# Patient Record
Sex: MALE | Race: WHITE | Employment: FULL TIME | URBAN - NONMETROPOLITAN AREA
[De-identification: names, ages, dates, MRNs, and addresses within clinical notes are randomized per-mention and may not be internally consistent; named-entity substitution may affect disease eponyms.]

---

## 2024-03-23 ENCOUNTER — HOSPITAL ENCOUNTER (EMERGENCY)
Age: 29
Discharge: HOME | End: 2024-03-23
Payer: COMMERCIAL

## 2024-03-23 VITALS
DIASTOLIC BLOOD PRESSURE: 73 MMHG | HEART RATE: 125 BPM | SYSTOLIC BLOOD PRESSURE: 113 MMHG | OXYGEN SATURATION: 96 % | TEMPERATURE: 100.5 F | HEIGHT: 76 IN | RESPIRATION RATE: 18 BRPM | WEIGHT: 235 LBS | BODY MASS INDEX: 28.62 KG/M2

## 2024-03-23 DIAGNOSIS — J01.00 ACUTE NON-RECURRENT MAXILLARY SINUSITIS: Primary | ICD-10-CM

## 2024-03-23 DIAGNOSIS — J06.9 VIRAL URI WITH COUGH: ICD-10-CM

## 2024-03-23 PROCEDURE — 99213 OFFICE O/P EST LOW 20 MIN: CPT

## 2024-03-23 PROCEDURE — 99202 OFFICE O/P NEW SF 15 MIN: CPT

## 2024-03-23 RX ORDER — LORATADINE 10 MG/1
10 CAPSULE, LIQUID FILLED ORAL DAILY
COMMUNITY

## 2024-03-23 RX ORDER — BENZONATATE 100 MG/1
100 CAPSULE ORAL 3 TIMES DAILY PRN
COMMUNITY
End: 2024-03-23 | Stop reason: ALTCHOICE

## 2024-03-23 RX ORDER — AMOXICILLIN AND CLAVULANATE POTASSIUM 875; 125 MG/1; MG/1
1 TABLET, FILM COATED ORAL 2 TIMES DAILY
Qty: 14 TABLET | Refills: 0 | Status: SHIPPED | OUTPATIENT
Start: 2024-03-23 | End: 2024-03-30

## 2024-03-23 RX ORDER — PREDNISONE 20 MG/1
20 TABLET ORAL 2 TIMES DAILY
Qty: 10 TABLET | Refills: 0 | Status: SHIPPED | OUTPATIENT
Start: 2024-03-23 | End: 2024-03-28

## 2024-03-23 RX ORDER — BENZONATATE 200 MG/1
200 CAPSULE ORAL 3 TIMES DAILY PRN
Qty: 30 CAPSULE | Refills: 0 | Status: SHIPPED | OUTPATIENT
Start: 2024-03-23 | End: 2024-03-30

## 2024-03-23 ASSESSMENT — ENCOUNTER SYMPTOMS
COUGH: 1
SINUS PRESSURE: 1
VOMITING: 0
ABDOMINAL PAIN: 0
SINUS PAIN: 1
WHEEZING: 0
SHORTNESS OF BREATH: 0
NAUSEA: 0

## 2024-03-23 ASSESSMENT — PAIN - FUNCTIONAL ASSESSMENT: PAIN_FUNCTIONAL_ASSESSMENT: NONE - DENIES PAIN

## 2024-03-23 NOTE — ED NOTES
Pt with complaints of a cough and sinus pressure for 3-4 weeks. States he has been started on many different decongestants and cough medication which has helped a little bit. States mucus is green.     Giovanny Manriquez, ROSE  03/23/24 1854

## 2024-03-23 NOTE — ED PROVIDER NOTES
[facility] URGENT CARE  Urgent Care Encounter      CHIEF COMPLAINT       Chief Complaint   Patient presents with    Cough    Sinusitis       Nurses Notes reviewed and I agree except as noted in the HPI.  HISTORY OF PRESENT ILLNESS   Camilo Masterson is a 28 y.o. male who presents to urgent care with complaints of fever, sinus pressure and pain, cough, and body aches. Patient reports symptoms have been ongoing for two weeks. Patient reports fever just started a few days ago. Patient reports he has been taking sudafed for his symptoms. Patient denies abdominal pain, shortness of breath, diarrhea. Patient denies being around anyone sick recently.    REVIEW OF SYSTEMS     Review of Systems   Constitutional:  Positive for fatigue and fever.   HENT:  Positive for congestion, sinus pressure and sinus pain.    Respiratory:  Positive for cough. Negative for shortness of breath and wheezing.    Cardiovascular:  Negative for chest pain.   Gastrointestinal:  Negative for abdominal pain, nausea and vomiting.   Musculoskeletal:  Negative for arthralgias.   Neurological:  Positive for headaches. Negative for dizziness and numbness.       PAST MEDICAL HISTORY   History reviewed. No pertinent past medical history.    SURGICAL HISTORY     Patient  has a past surgical history that includes [place] tooth extraction.    CURRENT MEDICATIONS       Discharge Medication List as of 3/23/2024  6:58 PM        CONTINUE these medications which have NOT CHANGED    Details   loratadine (CLARITIN) 10 MG capsule Take 1 capsule by mouth dailyHistorical Med             ALLERGIES     Patient is has No Known Allergies.    FAMILY HISTORY     Patient'sfamily history is not on file.    SOCIAL HISTORY     Patient  reports that he has never smoked. He has never used smokeless tobacco. He reports that he does not drink alcohol and does not use drugs.    PHYSICAL EXAM     ED TRIAGE VITALS  BP: 113/73, Temp: (!) 100.5 °F (38.1 °C), Pulse: (!) 125,

## 2024-10-17 ENCOUNTER — OFFICE VISIT (OUTPATIENT)
Dept: FAMILY MEDICINE CLINIC | Age: 29
End: 2024-10-17

## 2024-10-17 VITALS
WEIGHT: 238.2 LBS | TEMPERATURE: 98.9 F | BODY MASS INDEX: 29.62 KG/M2 | DIASTOLIC BLOOD PRESSURE: 66 MMHG | RESPIRATION RATE: 16 BRPM | HEART RATE: 72 BPM | SYSTOLIC BLOOD PRESSURE: 122 MMHG | HEIGHT: 75 IN

## 2024-10-17 DIAGNOSIS — Z11.59 NEED FOR HEPATITIS C SCREENING TEST: ICD-10-CM

## 2024-10-17 DIAGNOSIS — Z23 NEED FOR INFLUENZA VACCINATION: ICD-10-CM

## 2024-10-17 DIAGNOSIS — Z23 NEED FOR TETANUS BOOSTER: ICD-10-CM

## 2024-10-17 DIAGNOSIS — N50.89 MASS OF RIGHT TESTICLE: Primary | ICD-10-CM

## 2024-10-17 SDOH — ECONOMIC STABILITY: FOOD INSECURITY: WITHIN THE PAST 12 MONTHS, YOU WORRIED THAT YOUR FOOD WOULD RUN OUT BEFORE YOU GOT MONEY TO BUY MORE.: PATIENT DECLINED

## 2024-10-17 SDOH — ECONOMIC STABILITY: INCOME INSECURITY: HOW HARD IS IT FOR YOU TO PAY FOR THE VERY BASICS LIKE FOOD, HOUSING, MEDICAL CARE, AND HEATING?: PATIENT DECLINED

## 2024-10-17 SDOH — ECONOMIC STABILITY: FOOD INSECURITY: WITHIN THE PAST 12 MONTHS, THE FOOD YOU BOUGHT JUST DIDN'T LAST AND YOU DIDN'T HAVE MONEY TO GET MORE.: PATIENT DECLINED

## 2024-10-17 ASSESSMENT — ENCOUNTER SYMPTOMS
EYE REDNESS: 0
DIARRHEA: 0
BLOOD IN STOOL: 0
SORE THROAT: 0
NAUSEA: 0
COUGH: 0
CONSTIPATION: 0
ABDOMINAL DISTENTION: 0
SHORTNESS OF BREATH: 0
ABDOMINAL PAIN: 0
ANAL BLEEDING: 0
EYE DISCHARGE: 0
RHINORRHEA: 1
COLOR CHANGE: 0

## 2024-10-17 ASSESSMENT — PATIENT HEALTH QUESTIONNAIRE - PHQ9
SUM OF ALL RESPONSES TO PHQ QUESTIONS 1-9: 0
SUM OF ALL RESPONSES TO PHQ QUESTIONS 1-9: 0
2. FEELING DOWN, DEPRESSED OR HOPELESS: NOT AT ALL
SUM OF ALL RESPONSES TO PHQ QUESTIONS 1-9: 0
SUM OF ALL RESPONSES TO PHQ QUESTIONS 1-9: 0
1. LITTLE INTEREST OR PLEASURE IN DOING THINGS: NOT AT ALL
SUM OF ALL RESPONSES TO PHQ9 QUESTIONS 1 & 2: 0

## 2024-10-17 NOTE — PROGRESS NOTES
Vaccine Information Sheet, \"Influenza - Inactivated\"  given to Camilo Corona, and verbalized understanding.    Patient responses:    Have you ever had a reaction to a flu vaccine? No  Do you have an allergy to eggs, Latex -induced anaphylaxis, neomycin or polymixin?  No  Do you have an allergy to Thimerosal, contact lens solution, or Merthiolate? No  Have you ever had [place] Syndrome?  No  Do you have any current illness?  No  Do you have a temperature above 100.4 degrees? No  Do you have an active neurological disorder? No    Immunization(s) given during visit:    Immunizations Administered       Name Date Dose Route    Influenza, FLUCELVAX, (age 6 mo+) IM, Trivalent PF, 0.5mL 10/17/2024 0.5 mL Intramuscular    Site: Deltoid- Left    Lot: [id number]    NDC: 18407-795-53    TDaP, ADACEL (age 10y-64y), BOOSTRIX (age 10y+), IM, 0.5mL 10/17/2024 0.5 mL Intramuscular    Site: Deltoid- Left    Lot: [id number]    ND: 41274-931-96          After obtaining consent, and per orders of Kaitlynn Hernandez CNP, the injections above were given by Jonathan Barnard LPN. Medications were verified by Tawny Huber RN.    Patient tolerated well.  
Conjunctivae normal.   Pulmonary:      Effort: Pulmonary effort is normal. No respiratory distress.   Abdominal:      General: There is no distension.      Tenderness: There is no guarding.   Genitourinary:     Testes:         Right: Mass and tenderness present.   Musculoskeletal:         General: No tenderness or deformity. Normal range of motion.      Cervical back: Normal range of motion and neck supple.   Skin:     Coloration: Skin is not pale.      Findings: No erythema or rash (On exposed areas).   Neurological:      General: No focal deficit present.      Mental Status: He is alert and oriented to person, place, and time.      Gait: Gait normal.   Psychiatric:         Mood and Affect: Mood normal.         Speech: Speech normal.         Behavior: Behavior normal.         Thought Content: Thought content normal.         Judgment: Judgment normal.       No results found for: \"WBC\", \"HGB\", \"HCT\", \"PLT\", \"CHOL\", \"TRIG\", \"HDL\", \"LDLDIRECT\", \"LDL\", \"AST\", \"NA\", \"K\", \"CL\", \"CREATININE\", \"BUN\", \"CO2\", \"TSH\", \"PSA\", \"INR\", \"GLUF\", \"LABA1C\", \"LABGLOM\", \"MG\", \"CALCIUM\", \"VITD25\"  Assessment:   Assessment & Plan    Diagnosis Orders   1. Mass of right testicle  US SCROTUM AND TESTICLES    CBC with Auto Differential    Comprehensive Metabolic Panel    TSH with Reflex    T4, Free    Lipid Panel    Vitamin D 25 Hydroxy    Glucose, Fasting      2. Need for hepatitis C screening test  Hepatitis C Antibody      3. Need for influenza vaccination  Influenza, FLUCELVAX Trivalent, (age 6 mo+) IM, Preservative Free, 0.5mL      4. Need for tetanus booster  Tdap, BOOSTRIX, (age 10 yrs+), IM          Plan:   Vaccinations today  Ultrasound ordered  Labs ordered  Back in 4 weeks    Return in about 4 weeks (around 11/14/2024) for Annual Physical.    Orders Placed:  Orders Placed This Encounter   Procedures    US SCROTUM AND TESTICLES    Tdap, BOOSTRIX, (age 10 yrs+), IM    Influenza, FLUCELVAX Trivalent, (age 6 mo+) IM, Preservative Free,

## 2024-10-24 ENCOUNTER — HOSPITAL ENCOUNTER (OUTPATIENT)
Dept: ULTRASOUND IMAGING | Age: 29
Discharge: HOME | End: 2024-10-24
Payer: COMMERCIAL

## 2024-10-24 DIAGNOSIS — N50.89 MASS OF RIGHT TESTICLE: ICD-10-CM

## 2024-10-24 PROCEDURE — 76870 US EXAM SCROTUM: CPT

## 2024-10-25 ENCOUNTER — TELEPHONE (OUTPATIENT)
Dept: FAMILY MEDICINE CLINIC | Age: 29
End: 2024-10-25

## 2024-10-25 NOTE — TELEPHONE ENCOUNTER
----- Message from SLY Verde CNP sent at 10/25/2024  7:54 AM EDT -----  Please let pt know that his US did not show anything acute, but he dose have a left sided variocele and a right sided hydrocele.  I would recommend follow up with urology for further evaluation.  OK for [facility] referral if pt is willing. -WS

## 2024-10-29 NOTE — TELEPHONE ENCOUNTER
Spoke to pt and notified him of the ultrasound result and recommendation for urology referral. Pt declines referral at this time but will notify the office if he changes his mind in the future.

## 2024-11-14 ENCOUNTER — OFFICE VISIT (OUTPATIENT)
Dept: FAMILY MEDICINE CLINIC | Age: 29
End: 2024-11-14
Payer: COMMERCIAL

## 2024-11-14 VITALS
BODY MASS INDEX: 30.27 KG/M2 | WEIGHT: 242.2 LBS | DIASTOLIC BLOOD PRESSURE: 66 MMHG | RESPIRATION RATE: 14 BRPM | SYSTOLIC BLOOD PRESSURE: 112 MMHG | HEART RATE: 80 BPM

## 2024-11-14 DIAGNOSIS — M25.832 MASS OF JOINT OF LEFT WRIST: ICD-10-CM

## 2024-11-14 DIAGNOSIS — N43.3 RIGHT HYDROCELE: Primary | ICD-10-CM

## 2024-11-14 PROCEDURE — 99213 OFFICE O/P EST LOW 20 MIN: CPT | Performed by: NURSE PRACTITIONER

## 2024-11-14 ASSESSMENT — ENCOUNTER SYMPTOMS
CONSTIPATION: 0
COLOR CHANGE: 0
ABDOMINAL DISTENTION: 0
EYE REDNESS: 0
DIARRHEA: 0
SORE THROAT: 0
ABDOMINAL PAIN: 0
NAUSEA: 0
SHORTNESS OF BREATH: 0
COUGH: 0
BLOOD IN STOOL: 0
EYE DISCHARGE: 0
RHINORRHEA: 0
ANAL BLEEDING: 0

## 2024-11-14 NOTE — PROGRESS NOTES
is not in acute distress.     Appearance: Normal appearance. He is well-developed. He is not ill-appearing or diaphoretic.   HENT:      Head: Normocephalic and atraumatic.      Right Ear: Hearing and external ear normal. No decreased hearing noted.      Left Ear: Hearing and external ear normal. No decreased hearing noted.      Nose: Nose normal. No nasal deformity.   Eyes:      General:         Right eye: No discharge.         Left eye: No discharge.      Conjunctiva/sclera: Conjunctivae normal.   Pulmonary:      Effort: Pulmonary effort is normal. No respiratory distress.   Abdominal:      General: There is no distension.      Tenderness: There is no guarding.   Musculoskeletal:         General: No deformity.      Left wrist: Tenderness present. Decreased range of motion.      Cervical back: Normal range of motion and neck supple.   Skin:     Coloration: Skin is not pale.      Findings: No erythema or rash (On exposed areas).   Neurological:      General: No focal deficit present.      Mental Status: He is alert and oriented to person, place, and time.      Gait: Gait normal.   Psychiatric:         Mood and Affect: Mood normal.         Speech: Speech normal.         Behavior: Behavior normal.         Thought Content: Thought content normal.         Judgment: Judgment normal.         No results found for: \"WBC\", \"HGB\", \"HCT\", \"PLT\", \"CHOL\", \"TRIG\", \"HDL\", \"LDLDIRECT\", \"LDL\", \"AST\", \"NA\", \"K\", \"CL\", \"CREATININE\", \"BUN\", \"CO2\", \"TSH\", \"PSA\", \"INR\", \"GLUF\", \"LABA1C\", \"LABGLOM\", \"MG\", \"CALCIUM\", \"VITD25\"  Assessment:   Assessment & Plan    Diagnosis Orders   1. Right hydrocele  Hao Mclean MD, Urology, [place]      2. Mass of joint of left wrist  XR WRIST LEFT (MIN 3 VIEWS)          Plan:   Xr wrist  Referral to Urology   No follow-ups on file.    Orders Placed:  Orders Placed This Encounter   Procedures    XR WRIST LEFT (MIN 3 VIEWS)    Hao Mclean MD, Urology, [place]     Medications Prescribed:  No orders

## 2024-11-26 ENCOUNTER — TELEPHONE (OUTPATIENT)
Dept: UROLOGY | Age: 29
End: 2024-11-26

## 2024-11-26 ENCOUNTER — OFFICE VISIT (OUTPATIENT)
Dept: UROLOGY | Age: 29
End: 2024-11-26
Payer: COMMERCIAL

## 2024-11-26 VITALS — RESPIRATION RATE: 18 BRPM | BODY MASS INDEX: 29.59 KG/M2 | HEIGHT: 75 IN | WEIGHT: 238 LBS

## 2024-11-26 DIAGNOSIS — Z01.818 PRE-OP TESTING: ICD-10-CM

## 2024-11-26 DIAGNOSIS — N47.1 PHIMOSIS OF PENIS: ICD-10-CM

## 2024-11-26 DIAGNOSIS — N47.1 PHIMOSIS OF PENIS: Primary | ICD-10-CM

## 2024-11-26 DIAGNOSIS — N43.3 RIGHT HYDROCELE: Primary | ICD-10-CM

## 2024-11-26 DIAGNOSIS — I86.1 LEFT VARICOCELE: ICD-10-CM

## 2024-11-26 DIAGNOSIS — N47.1 PHIMOSIS: ICD-10-CM

## 2024-11-26 PROCEDURE — 99204 OFFICE O/P NEW MOD 45 MIN: CPT

## 2024-11-26 NOTE — TELEPHONE ENCOUNTER
Patient is scheduled for surgery with  on . Surgery consent to be done on arrival. Dr. Bowen states he/she does not follow with a cardiologist. Patient to do pre op. Surgery instructions gone over with patient verbally in the office or mailed to the patient.     Patient informed an adult over the age of 18 must be with them at the time of surgery and upon discharge

## 2024-11-26 NOTE — PROGRESS NOTES
[facility] [place] [facility]  [facility] UROLOGY  [address]  [address]  [facility] [address]  Dept: [phone]  Loc: [phone]  Visit Date: 11/26/2024      HPI  Camilo Masterson is a 29 y.o. male that presents to the urology clinic as a new patient for the evaluation of right-sided testicular \"lump.\"    Right Hydrocele  Brice was referred to our office by his PCP: Kaitlynn BOSWELL for further evaluation and management of right testicular \"lump.\" Detected ~ 3 months ago on self exam. Soft and non-tender. US Scrotum & Testicles with findings of a right-sided hydrocele. Small and not overly bothersome.    Phimosis  Uncircumcised male with tight phimosis that is becoming increasingly bothersome both cosmetically and functionally.      I independently reviewed and verified the images and reports from:    US SCROTUM AND TESTICLES    Result Date: 10/24/2024  PROCEDURE: US SCROTUM AND TESTICLES CLINICAL INFORMATION: Right testicle mass TECHNIQUE: Ultrasound of the scrotum was performed. Grayscale and color Doppler images with spectral analysis were obtained. COMPARISON: None FINDINGS: B-mode study: The right testicle measures 5.2 x 3.8 x 2.3 cm and the left testicle measures 5.8 x 3.0 x 2.0 cm. Both testicles are homogeneous in echotexture. The bilateral epididymides are normal. There is a large right-sided hydrocele. Doppler study: There is color Doppler flow in both testicles and in the extratesticular structures. Flow is symmetric bilaterally. There is no Doppler evidence of torsion. There are prominent vessels on the left side with increased vascular flow during Valsalva maneuvers.     1. Normal bilateral testicular ultrasound.     2. Large right-sided hydrocele.     3. Possible left-sided varicocele.    Electronically signed by Dr. Nomi Triana        PAST MEDICAL, FAMILY AND SOCIAL HISTORY:  No past medical history on file.  Past Surgical History:   Procedure Laterality

## 2024-11-26 NOTE — TELEPHONE ENCOUNTER
DO NOT TAKE  FISH OIL, MOBIC, IBUPROFEN, MOTRIN-LIKE DRUGS AND ANY MULTIVITAMINS OR OVER THE COUNTER SUPPLEMENTS 14 DAYS PRIOR TO SURGERY.    HOLD ASPIRIN 5 DAYS PRIOR TO SURGERY    IF YOU TAKE GLUCOPHAGE, TRADJENTA, METFORMIN OR JANUMET, HOLD 2 DAYS PRIOR TO SURGERY    MUST HAVE AN ADULT OVER THE AGE OF 18 WITH YOU AT THE TIME OF THE DISCHARGE         Camilo Masterson 1995     Surgical Physician: Dr. Cartagena      You have been scheduled for the procedure marked below:      Surgery: Circumcision         Date:  1/31/25    Anesthesia:  MAC     Place of Service: [facility] --Second Floor Same Day Surgery         PLEASE CALL SURGERY DESK THE DAY PRIOR TO SURGERY -[phone] (CALL FRIDAY IF SURGERY IS ON MONDAY) BETWEEN 8AM AND 4PM, FOR ARRIVAL TIME.       INSTRUCTIONS AS MARKED BELOW:    1.  DO NOT eat or drink anything after midnight before surgery.   2.  We prefer you shower or bathe with an antibacterial soap (Dial) the morning of surgery.  3  Please bring a current medication list, photo ID and insurance card(s) with you  4. Okay to take Tylenol  5. Take blood pressure or heart medication as directed, if taken in the morning take with a small sip of water  6.The office will call you in 1-2 days after your procedure to schedule a follow up.    DATE SENSITIVE PRE OP TESTING:    TO AVOID YOUR SURGERY BEING CANCELLED DO ON THE DATE LISTED *WALK IN *NO APPOINTMENT.      DO THE PRE OP URINE CULTURE ON 1/17/25. ORDER GIVEN        Date: 11/26/2024

## 2025-01-06 ENCOUNTER — PREP FOR PROCEDURE (OUTPATIENT)
Dept: UROLOGY | Age: 30
End: 2025-01-06

## 2025-01-10 RX ORDER — SODIUM CHLORIDE 9 MG/ML
INJECTION, SOLUTION INTRAVENOUS PRN
OUTPATIENT
Start: 2025-01-10

## 2025-01-10 RX ORDER — SODIUM CHLORIDE 0.9 % (FLUSH) 0.9 %
5-40 SYRINGE (ML) INJECTION PRN
OUTPATIENT
Start: 2025-01-10

## 2025-01-10 RX ORDER — SODIUM CHLORIDE 0.9 % (FLUSH) 0.9 %
5-40 SYRINGE (ML) INJECTION EVERY 12 HOURS SCHEDULED
OUTPATIENT
Start: 2025-01-10

## 2025-01-15 NOTE — PROGRESS NOTES
PAT Call Date:attempted 1/15   Surgery Date: 1/24    Surgeon: Osiel   Surgery: circ    Any Isolation Precautions? No    Type of Isolation Precaution: Not Applicable   Is patient from a nursing home? No  Name of Nursing Home:   Any equipment assist needed for moving patient? No   Type of Equipment: Not Applicable  Patient last weight: 238 lb     Hard Copy on Chart  In EPIC Pending/Notes   Consent -   Within 30 days; signed, dated & timed by patient and physician     [] On Arrival     [] Blood      [] DNR   H&P -   Within 30 days  11/26  [] Physician To Do     Clearance -      []Medical     []Cardiac     [] Pulmonary    Orders -   Signed and Dated    1/10  [] Physician To Do    Labs -   Within 3 months   N/a          ordered  [] CBC    [] BMP   [] GFR   [] INR    [] PTT    [x] Urine    [] Liver Enzymes    [] MRSA Nasal      Others:     Radiology Studies -   Within 1 year  N/a        10/24  [] Chest X-Ray   [] MRI    [] CT    [] Vascular   [x] US     Pulmonary -     [] JONNATHAN   [] CPAP     Cardiac Workup -   Stress Test, Echo, Cath within 18 months  N/a  [] EKG      [] Cath                 [] Stress Test                      [] Echo/AMOS   [] CABG   [] Holter Monitor      [] Pacemaker/ICD        Brand:        Where does patient have checked:         Last check:         Rep Notified:

## 2025-01-15 NOTE — PROGRESS NOTES
PAT call attempted, patient did not answer and unable to leave a message due to voicemail box is full

## 2025-03-14 ENCOUNTER — HOSPITAL ENCOUNTER (OUTPATIENT)
Age: 30
Discharge: HOME | End: 2025-03-14
Payer: COMMERCIAL

## 2025-03-14 DIAGNOSIS — Z01.818 PRE-OP TESTING: ICD-10-CM

## 2025-03-14 DIAGNOSIS — N47.1 PHIMOSIS OF PENIS: ICD-10-CM

## 2025-03-14 LAB
ANION GAP SERPL CALC-SCNC: 10 MEQ/L (ref 8–16)
BASOPHILS ABSOLUTE: 0 THOU/MM3 (ref 0–0.1)
BASOPHILS NFR BLD AUTO: 0.8 %
BUN SERPL-MCNC: 24 MG/DL (ref 8–23)
CALCIUM SERPL-MCNC: 9 MG/DL (ref 8.6–10)
CHLORIDE SERPL-SCNC: 106 MEQ/L (ref 98–111)
CO2 SERPL-SCNC: 24 MEQ/L (ref 22–29)
CREAT SERPL-MCNC: 1 MG/DL (ref 0.7–1.2)
DEPRECATED RDW RBC AUTO: 42.9 FL (ref 35–45)
EOSINOPHIL NFR BLD AUTO: 2.4 %
EOSINOPHILS ABSOLUTE: 0.1 THOU/MM3 (ref 0–0.4)
ERYTHROCYTE [DISTWIDTH] IN BLOOD BY AUTOMATED COUNT: 12.7 % (ref 11.5–14.5)
GFR SERPL CREATININE-BSD FRML MDRD: > 90 ML/MIN/1.73M2
GLUCOSE SERPL-MCNC: 84 MG/DL (ref 74–109)
HCT VFR BLD AUTO: 41.9 % (ref 42–52)
HGB BLD-MCNC: 13.7 GM/DL (ref 14–18)
IMM GRANULOCYTES # BLD AUTO: 0.01 THOU/MM3 (ref 0–0.07)
IMM GRANULOCYTES NFR BLD AUTO: 0.2 %
LYMPHOCYTES ABSOLUTE: 1.9 THOU/MM3 (ref 1–4.8)
LYMPHOCYTES NFR BLD AUTO: 37 %
MCH RBC QN AUTO: 30.3 PG (ref 26–33)
MCHC RBC AUTO-ENTMCNC: 32.7 GM/DL (ref 32.2–35.5)
MCV RBC AUTO: 92.7 FL (ref 80–94)
MONOCYTES ABSOLUTE: 0.3 THOU/MM3 (ref 0.4–1.3)
MONOCYTES NFR BLD AUTO: 5.5 %
NEUTROPHILS ABSOLUTE: 2.8 THOU/MM3 (ref 1.8–7.7)
NEUTROPHILS NFR BLD AUTO: 54.1 %
NRBC BLD AUTO-RTO: 0 /100 WBC
PLATELET # BLD AUTO: 231 THOU/MM3 (ref 130–400)
PMV BLD AUTO: 10.7 FL (ref 9.4–12.4)
POTASSIUM SERPL-SCNC: 5 MEQ/L (ref 3.5–5.2)
RBC # BLD AUTO: 4.52 MILL/MM3 (ref 4.7–6.1)
SODIUM SERPL-SCNC: 140 MEQ/L (ref 135–145)
WBC # BLD AUTO: 5.1 THOU/MM3 (ref 4.8–10.8)

## 2025-03-14 PROCEDURE — 85025 COMPLETE CBC W/AUTO DIFF WBC: CPT

## 2025-03-14 PROCEDURE — 87086 URINE CULTURE/COLONY COUNT: CPT

## 2025-03-14 PROCEDURE — 36415 COLL VENOUS BLD VENIPUNCTURE: CPT

## 2025-03-14 PROCEDURE — 80048 BASIC METABOLIC PNL TOTAL CA: CPT

## 2025-03-16 LAB
BACTERIA UR CULT: ABNORMAL
ORGANISM: ABNORMAL

## 2025-03-17 ENCOUNTER — RESULTS FOLLOW-UP (OUTPATIENT)
Dept: UROLOGY | Age: 30
End: 2025-03-17

## 2025-03-18 ENCOUNTER — PREP FOR PROCEDURE (OUTPATIENT)
Dept: UROLOGY | Age: 30
End: 2025-03-18

## 2025-03-21 NOTE — PROGRESS NOTES
Follow all instructions given by your physician  If Urology case Call [phone] the weekday before procedure to find out Arrival Time.  Do not eat or drink anything after midnight prior to surgery(includes water, chewing gum, mints and ice chips)  Sips of water am of surgery with allowed medications  May brush teeth   Do not smoke or chew tobacco, drink alcoholic beverages or use any illicit drugs for 24 hours prior to surgery  Bring insurance info and photo ID  Bring pertinent paperwork with you from Doctor or surgeons's office  Wear clean comfortable, loose-fitting clothing  No make-up, nail polish, jewelry, piercings, or contact lenses to be worn day of surgery  No glue on dentures morning of surgery; you will be asked to remove them for surgery. Case for glasses.  Shower the night before and the morning of surgery with cleansing soap provided or a liquid antibacterial soap, dry with new fresh clean towel after each shower, no lotions, creams or powder.  Clean sheets and pillowcase on bed night before surgery  Bring medications in original bottles, Bring rescue inhalers with you  Bring CPAP/BIPAP machine if you have one ( you may be charged if one is needed in recovery room )    Do you have a DNR? no  Please Bring Healthcare Directive or Healthcare Power of  in so we can scan it into your chart.    Our pharmacy has a Meds to Beds program where they will deliver any new prescriptions you may have to your room before you leave. Our Pharmacy will clear it through your insurance; for example (same co pay). This enables you to take your new RX as soon as you need when you get home and avoids stop/wait delays on the way home.  Please have a form of payment with you and have someone designated as your Pharmacy contact with their phone # as you may not feel well or still be under the influence of anesthesia.    Please refer to the SSI-Surgical Site Infection Flyer you hopefully received in the mail-together we

## 2025-03-28 ENCOUNTER — ANESTHESIA EVENT (OUTPATIENT)
Dept: OPERATING ROOM | Age: 30
End: 2025-03-28
Payer: COMMERCIAL

## 2025-03-28 ENCOUNTER — TELEPHONE (OUTPATIENT)
Dept: UROLOGY | Age: 30
End: 2025-03-28

## 2025-03-28 ENCOUNTER — HOSPITAL ENCOUNTER (OUTPATIENT)
Age: 30
Setting detail: OUTPATIENT SURGERY
Discharge: HOME | End: 2025-03-28
Attending: UROLOGY | Admitting: UROLOGY
Payer: COMMERCIAL

## 2025-03-28 ENCOUNTER — ANESTHESIA (OUTPATIENT)
Dept: OPERATING ROOM | Age: 30
End: 2025-03-28
Payer: COMMERCIAL

## 2025-03-28 VITALS
BODY MASS INDEX: 29.4 KG/M2 | RESPIRATION RATE: 17 BRPM | OXYGEN SATURATION: 98 % | HEIGHT: 75 IN | DIASTOLIC BLOOD PRESSURE: 74 MMHG | TEMPERATURE: 98.3 F | SYSTOLIC BLOOD PRESSURE: 125 MMHG | WEIGHT: 236.5 LBS | HEART RATE: 68 BPM

## 2025-03-28 DIAGNOSIS — G89.18 POSTOPERATIVE PAIN: ICD-10-CM

## 2025-03-28 DIAGNOSIS — N47.1 PHIMOSIS: Primary | ICD-10-CM

## 2025-03-28 PROCEDURE — 3700000001 HC ADD 15 MINUTES (ANESTHESIA): Performed by: UROLOGY

## 2025-03-28 PROCEDURE — 7100000000 HC PACU RECOVERY - FIRST 15 MIN: Performed by: UROLOGY

## 2025-03-28 PROCEDURE — 6360000002 HC RX W HCPCS

## 2025-03-28 PROCEDURE — 7100000010 HC PHASE II RECOVERY - FIRST 15 MIN: Performed by: UROLOGY

## 2025-03-28 PROCEDURE — 3600000002 HC SURGERY LEVEL 2 BASE: Performed by: UROLOGY

## 2025-03-28 PROCEDURE — 6370000000 HC RX 637 (ALT 250 FOR IP): Performed by: UROLOGY

## 2025-03-28 PROCEDURE — 7100000001 HC PACU RECOVERY - ADDTL 15 MIN: Performed by: UROLOGY

## 2025-03-28 PROCEDURE — 6360000002 HC RX W HCPCS: Performed by: UROLOGY

## 2025-03-28 PROCEDURE — 6360000002 HC RX W HCPCS: Performed by: ANESTHESIOLOGY

## 2025-03-28 PROCEDURE — 3600000012 HC SURGERY LEVEL 2 ADDTL 15MIN: Performed by: UROLOGY

## 2025-03-28 PROCEDURE — 2709999900 HC NON-CHARGEABLE SUPPLY: Performed by: UROLOGY

## 2025-03-28 PROCEDURE — 7100000011 HC PHASE II RECOVERY - ADDTL 15 MIN: Performed by: UROLOGY

## 2025-03-28 PROCEDURE — 2500000003 HC RX 250 WO HCPCS: Performed by: UROLOGY

## 2025-03-28 PROCEDURE — 3700000000 HC ANESTHESIA ATTENDED CARE: Performed by: UROLOGY

## 2025-03-28 PROCEDURE — 2580000003 HC RX 258: Performed by: UROLOGY

## 2025-03-28 PROCEDURE — 6360000002 HC RX W HCPCS: Performed by: REGISTERED NURSE

## 2025-03-28 RX ORDER — LIDOCAINE HYDROCHLORIDE 20 MG/ML
INJECTION, SOLUTION INTRAVENOUS
Status: DISCONTINUED | OUTPATIENT
Start: 2025-03-28 | End: 2025-03-28 | Stop reason: SDUPTHER

## 2025-03-28 RX ORDER — FENTANYL CITRATE 50 UG/ML
25 INJECTION, SOLUTION INTRAMUSCULAR; INTRAVENOUS EVERY 5 MIN PRN
Status: DISCONTINUED | OUTPATIENT
Start: 2025-03-28 | End: 2025-03-28 | Stop reason: HOSPADM

## 2025-03-28 RX ORDER — SODIUM CHLORIDE 0.9 % (FLUSH) 0.9 %
5-40 SYRINGE (ML) INJECTION PRN
Status: DISCONTINUED | OUTPATIENT
Start: 2025-03-28 | End: 2025-03-28 | Stop reason: HOSPADM

## 2025-03-28 RX ORDER — SODIUM CHLORIDE 0.9 % (FLUSH) 0.9 %
5-40 SYRINGE (ML) INJECTION EVERY 12 HOURS SCHEDULED
Status: DISCONTINUED | OUTPATIENT
Start: 2025-03-28 | End: 2025-03-28 | Stop reason: HOSPADM

## 2025-03-28 RX ORDER — NALOXONE HYDROCHLORIDE 0.4 MG/ML
INJECTION, SOLUTION INTRAMUSCULAR; INTRAVENOUS; SUBCUTANEOUS PRN
Status: DISCONTINUED | OUTPATIENT
Start: 2025-03-28 | End: 2025-03-28 | Stop reason: HOSPADM

## 2025-03-28 RX ORDER — ONDANSETRON 2 MG/ML
INJECTION INTRAMUSCULAR; INTRAVENOUS
Status: DISCONTINUED | OUTPATIENT
Start: 2025-03-28 | End: 2025-03-28 | Stop reason: SDUPTHER

## 2025-03-28 RX ORDER — PROPOFOL 10 MG/ML
INJECTION, EMULSION INTRAVENOUS
Status: DISCONTINUED | OUTPATIENT
Start: 2025-03-28 | End: 2025-03-28 | Stop reason: SDUPTHER

## 2025-03-28 RX ORDER — BUPIVACAINE HYDROCHLORIDE 2.5 MG/ML
INJECTION, SOLUTION INFILTRATION; PERINEURAL PRN
Status: DISCONTINUED | OUTPATIENT
Start: 2025-03-28 | End: 2025-03-28 | Stop reason: ALTCHOICE

## 2025-03-28 RX ORDER — FENTANYL CITRATE 50 UG/ML
INJECTION, SOLUTION INTRAMUSCULAR; INTRAVENOUS
Status: DISCONTINUED | OUTPATIENT
Start: 2025-03-28 | End: 2025-03-28 | Stop reason: SDUPTHER

## 2025-03-28 RX ORDER — GINSENG 100 MG
CAPSULE ORAL PRN
Status: DISCONTINUED | OUTPATIENT
Start: 2025-03-28 | End: 2025-03-28 | Stop reason: ALTCHOICE

## 2025-03-28 RX ORDER — SODIUM CHLORIDE 9 MG/ML
INJECTION, SOLUTION INTRAVENOUS PRN
Status: DISCONTINUED | OUTPATIENT
Start: 2025-03-28 | End: 2025-03-28 | Stop reason: HOSPADM

## 2025-03-28 RX ORDER — ONDANSETRON 4 MG/1
4 TABLET, FILM COATED ORAL EVERY 8 HOURS PRN
COMMUNITY

## 2025-03-28 RX ORDER — FENTANYL CITRATE 50 UG/ML
50 INJECTION, SOLUTION INTRAMUSCULAR; INTRAVENOUS EVERY 5 MIN PRN
Status: DISCONTINUED | OUTPATIENT
Start: 2025-03-28 | End: 2025-03-28 | Stop reason: HOSPADM

## 2025-03-28 RX ORDER — HYDROCODONE BITARTRATE AND ACETAMINOPHEN 5; 325 MG/1; MG/1
1 TABLET ORAL EVERY 6 HOURS PRN
Qty: 12 TABLET | Refills: 0 | Status: SHIPPED | OUTPATIENT
Start: 2025-03-28 | End: 2025-03-31

## 2025-03-28 RX ORDER — DEXAMETHASONE SODIUM PHOSPHATE 4 MG/ML
INJECTION, SOLUTION INTRA-ARTICULAR; INTRALESIONAL; INTRAMUSCULAR; INTRAVENOUS; SOFT TISSUE
Status: DISCONTINUED | OUTPATIENT
Start: 2025-03-28 | End: 2025-03-28 | Stop reason: SDUPTHER

## 2025-03-28 RX ADMIN — LIDOCAINE HYDROCHLORIDE 100 MG: 20 INJECTION, SOLUTION INTRAVENOUS at 09:24

## 2025-03-28 RX ADMIN — HYDROMORPHONE HYDROCHLORIDE 0.5 MG: 1 INJECTION, SOLUTION INTRAMUSCULAR; INTRAVENOUS; SUBCUTANEOUS at 10:30

## 2025-03-28 RX ADMIN — FENTANYL CITRATE 25 MCG: 50 INJECTION, SOLUTION INTRAMUSCULAR; INTRAVENOUS at 09:45

## 2025-03-28 RX ADMIN — PROPOFOL 200 MG: 10 INJECTION, EMULSION INTRAVENOUS at 09:24

## 2025-03-28 RX ADMIN — FENTANYL CITRATE 25 MCG: 50 INJECTION, SOLUTION INTRAMUSCULAR; INTRAVENOUS at 09:28

## 2025-03-28 RX ADMIN — FENTANYL CITRATE 25 MCG: 50 INJECTION, SOLUTION INTRAMUSCULAR; INTRAVENOUS at 09:39

## 2025-03-28 RX ADMIN — DEXAMETHASONE SODIUM PHOSPHATE 4 MG: 4 INJECTION, SOLUTION INTRAMUSCULAR; INTRAVENOUS at 09:28

## 2025-03-28 RX ADMIN — HYDROMORPHONE HYDROCHLORIDE 0.5 MG: 1 INJECTION, SOLUTION INTRAMUSCULAR; INTRAVENOUS; SUBCUTANEOUS at 10:25

## 2025-03-28 RX ADMIN — SODIUM CHLORIDE: 0.9 INJECTION, SOLUTION INTRAVENOUS at 08:13

## 2025-03-28 RX ADMIN — ONDANSETRON 4 MG: 2 INJECTION INTRAMUSCULAR; INTRAVENOUS at 09:28

## 2025-03-28 RX ADMIN — FENTANYL CITRATE 25 MCG: 50 INJECTION, SOLUTION INTRAMUSCULAR; INTRAVENOUS at 09:36

## 2025-03-28 RX ADMIN — WATER 2000 MG: 1 INJECTION INTRAMUSCULAR; INTRAVENOUS; SUBCUTANEOUS at 09:34

## 2025-03-28 ASSESSMENT — PAIN SCALES - GENERAL
PAINLEVEL_OUTOF10: 7
PAINLEVEL_OUTOF10: 4
PAINLEVEL_OUTOF10: 0
PAINLEVEL_OUTOF10: 4
PAINLEVEL_OUTOF10: 7
PAINLEVEL_OUTOF10: 4

## 2025-03-28 ASSESSMENT — PAIN DESCRIPTION - DESCRIPTORS: DESCRIPTORS: DISCOMFORT

## 2025-03-28 ASSESSMENT — PAIN - FUNCTIONAL ASSESSMENT: PAIN_FUNCTIONAL_ASSESSMENT: 0-10

## 2025-03-28 NOTE — PROGRESS NOTES
Patient oriented to Same Day department and admitted to Same Day Surgery room 10.   Patient verbalized approval for first name, last initial with physician name on unit whiteboard.     Plan of care reviewed with patient.   Patient room whiteboard filled out and discussed with patient and responsible adult.       Call light in reach.   Bed in lowest position, locked, with one bed rail up.   SCDs and warming blanket in place.  Appropriate arm bands on patient.   Bathroom offered.   All questions and concerns of patient addressed.        Meds to Beds:   Patient informed of [facility] Meds to Beds program during admission. Patient has declined use of program.

## 2025-03-28 NOTE — BRIEF OP NOTE
Brief Postoperative Note      Patient: Camilo Masterson  YOB: 1995  MRN: [id number]    Date of Procedure: 3/28/2025    Pre-Op Diagnosis Codes:      * Phimosis [N47.1]    Post-Op Diagnosis: Same       Procedure(s):  CIRCUMCISION    Surgeon(s):  Jud Cartagena MD    Assistant:  * No surgical staff found *    Anesthesia: Monitor Anesthesia Care    Estimated Blood Loss (mL): Minimal    Complications: None    Specimens:   * No specimens in log *    Implants:  * No implants in log *      Drains: * No LDAs found *    Findings:  Pain meds abx   4 weeks dariela    Electronically signed by JUD CARTAGENA MD on 3/28/2025 at 9:32 AM

## 2025-03-28 NOTE — PROGRESS NOTES
Pt has met discharge criteria and states he is ready for discharge to home. IV removed, gauze and tape applied. Dressed in own clothes and personal belongings gathered. Discharge instructions (with opioid medication education information) given to pt and family; pt and family verbalized understanding of discharge instructions, prescriptions and follow up appointments. Pt transported to discharge lobby by [facility] staff.

## 2025-03-28 NOTE — ANESTHESIA POSTPROCEDURE EVALUATION
Department of Anesthesiology  Postprocedure Note    Patient: Camilo Masterson  MRN: [id number]  YOB: 1995  Date of evaluation: 3/28/2025    Procedure Summary       Date: 03/28/25 [facility]    Anesthesia Start: 0919 Anesthesia Stop: 1012    Procedure: CIRCUMCISION (Penis) Diagnosis:       Phimosis      (Phimosis [N47.1])    Surgeons: Hao Cartagena MD Responsible Provider: Yuniel Rojas DO    Anesthesia Type: general ASA Status: 2            Anesthesia Type: No value filed.    Mata Phase I: Mata Score: 10    Mata Phase II: Mata Score: 10    Anesthesia Post Evaluation    Comments: [facility]  POST-ANESTHESIA NOTE       Name:  Camilo Masterson                                         Age:  29 y.o.  MRN:  [id number]      Last Vitals:  /74   Pulse 68   Temp 98.3 °F (36.8 °C) (Temporal)   Resp 17   Ht 1.905 m (6' 3\")   Wt 107.3 kg (236 lb 8 oz)   SpO2 98%   BMI 29.56 kg/m²   Patient Vitals in the past 4 hrs:  03/28/25 1129, BP:125/74, Pulse:68, Resp:17, SpO2:98 %  03/28/25 1118, BP:130/77, Pulse:65, Resp:18, SpO2:97 %  03/28/25 1059, BP:122/77, Temp:98.3 °F (36.8 °C), Temp src:Temporal, Pulse:67, Resp:16, SpO2:98 %  03/28/25 1050, BP:118/65, Pulse:73, Resp:18, SpO2:96 %  03/28/25 1045, BP:118/65, Pulse:66, Resp:18, SpO2:96 %  03/28/25 1040, BP:121/69, Pulse:72, Resp:16, SpO2:98 %  03/28/25 1035, BP:116/65, Pulse:72, Resp:16, SpO2:98 %  03/28/25 1030, BP:112/64, Pulse:69, Resp:16, SpO2:96 %  03/28/25 1025, BP:112/65, Pulse:65, Resp:15, SpO2:97 %  03/28/25 1020, BP:107/62, Pulse:73, Resp:14, SpO2:97 %  03/28/25 1015, BP:108/62, Pulse:73, Resp:14, SpO2:97 %  03/28/25 1010, BP:(!) 109/58, Temp:97.4 °F (36.3 °C), Temp src:Temporal, Pulse:69, Resp:12, SpO2:97 %    Level of Consciousness:  Awake    Respiratory:  Stable    Oxygen Saturation:  Stable    Cardiovascular:  Stable    Hydration:  Adequate    PONV:  Stable    Post-op Pain:  Adequate

## 2025-03-28 NOTE — PROGRESS NOTES
1010 pt arrived to PACU, awakens to voice and states pain 4/10 and tolerable. VSS. Site CDI  1020 pt states pain 4/10 and tolerable. VSS  1025 c/o pain 7/10, medicated with 0.5 mg dilaudid  1030 no change in pain status, medicated with 0.5 mg dilaudid  1035 pt states pain 4/10 and tolerable. VSS  1050 meets criteria for discharge from PACU, transported to [facility] in stable condition

## 2025-03-28 NOTE — H&P
JUD NOVAK MD  History and Physical    Patient:  Camilo Masterson  MRN: [id number]  YOB: 1995    HISTORY OF PRESENT ILLNESS:     The patient is a 29 y.o. male who presents with phimosis. Here for procedure.    Patient's old records, notes and chart reviewed and summarized above.     JUD NOVAK MD independently reviewed the images and verified the radiology reports from:    No results found.      Past Medical History:    History reviewed. No pertinent past medical history.    Past Surgical History:    Past Surgical History:   Procedure Laterality Date    VASECTOMY  2023    WISDOM TOOTH EXTRACTION      x4       Medications Prior to Admission:    Prior to Admission medications    Medication Sig Start Date End Date Taking? Authorizing Provider   ondansetron (ZOFRAN) 4 MG tablet Take 1 tablet by mouth every 8 hours as needed for Nausea or Vomiting   Yes Provider, MD Zita       Allergies:  Patient has no known allergies.    Social History:    Social History     Socioeconomic History    Marital status:      Spouse name: Not on file    Number of children: Not on file    Years of education: Not on file    Highest education level: Not on file   Occupational History    Not on file   Tobacco Use    Smoking status: Never    Smokeless tobacco: Never   Vaping Use    Vaping status: Never Used   Substance and Sexual Activity    Alcohol use: Not Currently     Comment: rare 1/month    Drug use: Never    Sexual activity: Yes     Partners: Female   Other Topics Concern    Not on file   Social History Narrative    ** Merged History Encounter **          Social Drivers of Health     Financial Resource Strain: Patient Declined (10/17/2024)    Overall Financial Resource Strain (CARDIA)     Difficulty of Paying Living Expenses: Patient declined   Food Insecurity: Patient Declined (10/17/2024)    Hunger Vital Sign     Worried About Running Out of Food in the Last Year: Patient declined     Ran Out of  hours.    Invalid input(s): \"CA\"  No results found for: \"PSA\"      Urinalysis: No results for input(s): \"COLORU\", \"PHUR\", \"LABCAST\", \"WBCUA\", \"RBCUA\", \"MUCUS\", \"TRICHOMONAS\", \"YEAST\", \"BACTERIA\", \"CLARITYU\", \"SPECGRAV\", \"LEUKOCYTESUR\", \"UROBILINOGEN\", \"BILIRUBINUR\", \"BLOODU\" in the last 72 hours.    Invalid input(s): \"NITRATE\", \"GLUCOSEUKETONESUAMORPHOUS\"     -----------------------------------------------------------------      Assessment and Plan     Impression:    Patient Active Problem List   Diagnosis    Phimosis       Plan:     Consent obtained; circ in OR today    JUD NOVAK MD  7:51 AM 3/28/2025

## 2025-03-28 NOTE — PROGRESS NOTES
Pt returned to [facility] room 9. Vitals and assessment as charted. 0.9 infusing,  to count from PACU. Pt has crackers and water. Family at the bedside. Pt and family verbalized understanding of discharge criteria and call light use. Call light in reach.

## 2025-03-28 NOTE — DISCHARGE INSTRUCTIONS
Circumcision      Procedure   Circumcision is a surgery to remove the skin that covers the head of the penis (foreskin).         Post operative Recovery   -You will go home the day of the procedure   -If you have a dressing in place, it can be removed at home in 1 day.  If needed, wet dressing with water or saline for easier removal.    -Apply bacitracin twice daily and then cover the site with loose gauze to protect from rubbing on underwear or pants    -Over time the skin at the level of the sutures may separate; this is normal.  The sutures will fall out or absorb within 4-6 weeks.      -You may notice a small amount of bleeding which is normal.    -Some patients can have significant swelling and bruising postoperatively which is normal and will improve over time.     -You may shower daily starting the day after surgery but no swimming or bathing for 2 weeks.    -No sexual activity for one month   -Your physician may prescribe pain medication   -You can use ice as needed for discomfort for no longer than 10 minutes at a time.      Normal Postoperative Instructions   -No driving on day of surgery and while on narcotic pain medication   -Avoid strenuous activity on day of surgery   -Walk moderately at home   -Avoid strenuous exercise for 2 weeks.    -Ok to shower but no swimming or bathing for 2 weeks.   -Resume diet as tolerated   -Take prescriptions as directed   -No sexual activity for one month      Please call the office or hospital  with questions   Call or Present to ED if fever (> 101F), intractable nausea vomiting, or pain.      Office will facilitate scheduling of follow-up appointment.

## 2025-03-28 NOTE — ANESTHESIA PRE PROCEDURE
Department of Anesthesiology  Preprocedure Note       Name:  Camilo Masterson   Age:  29 y.o.  :  1995                                          MRN:  [id number]         Date:  3/28/2025      Surgeon: Surgeon(s):  Hao Cartagena MD    Procedure: Procedure(s):  CIRCUMCISION    Medications prior to admission:   Prior to Admission medications    Medication Sig Start Date End Date Taking? Authorizing Provider   ondansetron (ZOFRAN) 4 MG tablet Take 1 tablet by mouth every 8 hours as needed for Nausea or Vomiting   Yes Provider, MD Zita       Current medications:    Current Facility-Administered Medications   Medication Dose Route Frequency Provider Last Rate Last Admin   • sodium chloride flush 0.9 % injection 5-40 mL  5-40 mL IntraVENous 2 times per day Hao Cartagena MD       • sodium chloride flush 0.9 % injection 5-40 mL  5-40 mL IntraVENous PRN Hao Cartagena MD       • 0.9 % sodium chloride infusion   IntraVENous PRN Hao Cartagena  mL/hr at 25 0813 New Bag at 25 0813   • ceFAZolin (ANCEF) 2,000 mg in sterile water 20 mL IV syringe  2,000 mg IntraVENous On Call to OR Hao Cartagena MD           Allergies:  No Known Allergies    Problem List:    Patient Active Problem List   Diagnosis Code   • Phimosis N47.1       Past Medical History:  History reviewed. No pertinent past medical history.    Past Surgical History:        Procedure Laterality Date   • VASECTOMY     • WISDOM TOOTH EXTRACTION      x4       Social History:    Social History     Tobacco Use   • Smoking status: Never   • Smokeless tobacco: Never   Substance Use Topics   • Alcohol use: Not Currently     Comment: rare 1/month                                Counseling given: Not Answered      Vital Signs (Current):   Vitals:    25 1546 25 0730   BP:  126/69   Pulse:  78   Resp:  18   Temp:  97 °F (36.1 °C)   TempSrc:  Temporal   SpO2:  99%   Weight: 106.6 kg (235 lb) 107.3 kg (236 lb 8 oz)   Height: 1.905 m (6' 3\") 1.905 m

## 2025-03-28 NOTE — PLAN OF CARE
Patient discharge orders placed per recommendations of Dr. Hao Cartagena as written in the brief operative report.

## 2025-04-05 NOTE — OP NOTE
Hao Cartagena MD.  Urologic Surgery      [place], [place]. [facility]    DATE: 3/28/2025  Patient:  Camilo Masterson  MRN: [id number]  YOB: 1995    SURGEON: Hao Cartagena MD.    ASSISTANT: [email or web address]    PREOPERATIVE DIAGNOSIS: Phimosis    POSTOPERATIVE DIAGNOSIS: Phimosis    PROCEDURE PERFORMED: Circumcision, dorsal penile nerve block for postoperative pain    ANESTHESIA: Monitor Anesthesia Care    COMPLICATIONS: none    OR BLOOD LOSS:  Minimal    FLUIDS: Cystalloids per Anesthesia    SPECIMENS:  * No specimens in log *  none    DRAINS: none    INDICATIONS FOR PROCEDURE:  The patient is a 29 y.o. male who presents today with Phimosis [N47.1] here for CIRCUMCISION. After risks, benefits and alternatives of the procedure were discussed with the patient, the patient elected to proceed.     PROCEDURE:  The patient was brought back to the operating room, was laid in the supine position. Antibiotics were administered. General anesthesia was induced. He was then scrubbed and prepped in the usual sterile fashion. A proper time out was performed.  .  After retracting the foreskin, we then made a circumferential ele around the distal shaft, approximately 4 mm from the coronal sulcus.  A 15 blade scalpel was used to make an incision through the epidermis.  We then pulled the foreskin back to normal position, and marked out the proximal incision.  We used a 15 blade scalpel again to make an incision through the epidermis.  We then used bovie electrocautery to amputate the foreskin.  Meticulous hemostasis was achieved.      We then re-approximated the penile shaft skin with 3-0 chromic interrupted sutures.  Hemostasis was persistent.  Bacitracin ointment was placed around the incision.   .  dorsal penile nerve block for postop pain was performed.. His anesthesia was reversed and he was taken to recovery in stable condition. I was present for all critical portions of the case.

## 2025-04-25 ENCOUNTER — OFFICE VISIT (OUTPATIENT)
Dept: UROLOGY | Age: 30
End: 2025-04-25
Payer: COMMERCIAL

## 2025-04-25 VITALS — HEIGHT: 75 IN | RESPIRATION RATE: 16 BRPM | WEIGHT: 236 LBS | BODY MASS INDEX: 29.34 KG/M2

## 2025-04-25 DIAGNOSIS — N47.1 PHIMOSIS OF PENIS: Primary | ICD-10-CM

## 2025-04-25 DIAGNOSIS — N43.3 RIGHT HYDROCELE: ICD-10-CM

## 2025-04-25 PROCEDURE — G8427 DOCREV CUR MEDS BY ELIG CLIN: HCPCS

## 2025-04-25 PROCEDURE — 99213 OFFICE O/P EST LOW 20 MIN: CPT

## 2025-04-25 PROCEDURE — 1036F TOBACCO NON-USER: CPT

## 2025-04-25 PROCEDURE — G8417 CALC BMI ABV UP PARAM F/U: HCPCS

## 2025-04-25 NOTE — PROGRESS NOTES
[facility] [place] [facility]  [facility]OLOGY  [address]  [address]  [facility] [address]  Dept: [phone]  Loc: [phone]  Visit Date: 4/25/2025      HPI  Camilo Masterson is a 29 y.o. male that presents to the urology clinic for post-op check.    Phimosis  S/P Circumcision on 03/28/25 by Dr. Cartagena. Doing very well post-operatively. Great cosmesis. Pain resolved. Patient is pleased with outcome.    Right Hydrocele  Brice was referred to our office by his PCP: Kaitlynn BOSWELL for further evaluation and management of right testicular \"lump.\" Detected on self exam. Soft and non-tender. US Scrotum & Testicles with findings of a right-sided hydrocele. Small and not overly bothersome.        I independently reviewed and verified the images and reports from:    US SCROTUM AND TESTICLES    Result Date: 10/24/2024  PROCEDURE: US SCROTUM AND TESTICLES CLINICAL INFORMATION: Right testicle mass TECHNIQUE: Ultrasound of the scrotum was performed. Grayscale and color Doppler images with spectral analysis were obtained. COMPARISON: None FINDINGS: B-mode study: The right testicle measures 5.2 x 3.8 x 2.3 cm and the left testicle measures 5.8 x 3.0 x 2.0 cm. Both testicles are homogeneous in echotexture. The bilateral epididymides are normal. There is a large right-sided hydrocele. Doppler study: There is color Doppler flow in both testicles and in the extratesticular structures. Flow is symmetric bilaterally. There is no Doppler evidence of torsion. There are prominent vessels on the left side with increased vascular flow during Valsalva maneuvers.     1. Normal bilateral testicular ultrasound.     2. Large right-sided hydrocele.     3. Possible left-sided varicocele.    Electronically signed by Dr. Nomi Triana        PAST MEDICAL, FAMILY AND SOCIAL HISTORY:  History reviewed. No pertinent past medical history.  Past Surgical History:   Procedure Laterality Date    CIRCUMCISION

## (undated) DEVICE — GAUZE,SPONGE,8"X4",12PLY,XRAY,STRL,LF: Brand: MEDLINE

## (undated) DEVICE — GLOVE ORANGE PI 7 1/2   MSG9075

## (undated) DEVICE — ELECTRODE ELECSURG NDL 2.8 INX7.2 CM COAT INSUL EDGE

## (undated) DEVICE — ELECTRODE PT RET AD L9FT HI MOIST COND ADH HYDRGEL CORDED

## (undated) DEVICE — UROLOGY MINOR: Brand: MEDLINE INDUSTRIES, INC.

## (undated) DEVICE — SOLUTION IRRIG 1000ML 0.9% SOD CHL USP POUR PLAS BTL

## (undated) DEVICE — GOWN,SIRUS,NONRNF,SETINSLV,XL,20/CS: Brand: MEDLINE